# Patient Record
Sex: FEMALE | Race: WHITE | NOT HISPANIC OR LATINO | Employment: PART TIME | URBAN - METROPOLITAN AREA
[De-identification: names, ages, dates, MRNs, and addresses within clinical notes are randomized per-mention and may not be internally consistent; named-entity substitution may affect disease eponyms.]

---

## 2018-03-11 ENCOUNTER — HOSPITAL ENCOUNTER (EMERGENCY)
Facility: CLINIC | Age: 48
Discharge: HOME | End: 2018-03-11
Attending: NURSE PRACTITIONER | Admitting: NURSE PRACTITIONER
Payer: COMMERCIAL

## 2018-03-11 VITALS
OXYGEN SATURATION: 100 % | WEIGHT: 138 LBS | DIASTOLIC BLOOD PRESSURE: 85 MMHG | SYSTOLIC BLOOD PRESSURE: 120 MMHG | TEMPERATURE: 98.9 F | HEIGHT: 66 IN | BODY MASS INDEX: 22.18 KG/M2 | RESPIRATION RATE: 18 BRPM

## 2018-03-11 DIAGNOSIS — K08.89 TOOTH PAIN: ICD-10-CM

## 2018-03-11 DIAGNOSIS — K08.409 S/P TOOTH EXTRACTION: ICD-10-CM

## 2018-03-11 PROCEDURE — 99282 EMERGENCY DEPT VISIT SF MDM: CPT

## 2018-03-11 RX ORDER — OXYCODONE AND ACETAMINOPHEN 5; 325 MG/1; MG/1
1-2 TABLET ORAL EVERY 6 HOURS PRN
Qty: 15 TABLET | Refills: 0 | Status: SHIPPED | OUTPATIENT
Start: 2018-03-11

## 2018-03-11 NOTE — ED AVS SNAPSHOT
[facility] Emergency Department    [address]    [facility] [address]    Phone:  [phone]    Fax:  [phone]                                       Andressa Perez   MRN: [id number]    Department:  [facility] Emergency Department   Date of Visit:  3/11/2018           After Visit Summary Signature Page     I have received my discharge instructions, and my questions have been answered. I have discussed any challenges I see with this plan with the nurse or doctor.    ..........................................................................................................................................  Patient/Patient Representative Signature      ..........................................................................................................................................  Patient Representative Print Name and Relationship to Patient    ..................................................               ................................................  Date                                            Time    ..........................................................................................................................................  Reviewed by Signature/Title    ...................................................              ..............................................  Date                                                            Time

## 2018-03-11 NOTE — ED PROVIDER NOTES
"  History     Chief Complaint:  Dental Problem      HPI   Andressa Perez is a 47 year old female who presents with Dental problem. The patient states that she her left lower tooth removed 10 days ago. Initially ibuprofen and tylenol were helping her pain. However, 3 days ago her pain increased and she was started on amoxicillin by her dentis. She was seen at her primary care provider yesterday and was prescribed tylenol with codeine. The patient has been taking this without improvement. She notes that her pain is primarily along the left side of her jaw. The patient denies any other symptoms.     Allergies:  Sulfa Drugs     Medications:    No known drug allergies.      Past Medical History:    History reviewed.  No significant past medical history.      Past Surgical History:    Orthopedic Surgery     Family History:    History reviewed. No pertinent family history.     Social History:  Marital Status:   Presents to the ED alone  Tobacco Use: Never Used  Alcohol Use: No     Review of Systems   HENT: Positive for dental problem.    All other systems reviewed and are negative.      Physical Exam   First Vitals:  BP: 120/85  Heart Rate: 80  Temp: 98.9  F (37.2  C)  Resp: 18  Height: 167.6 cm (5' 6\")  Weight: 62.6 kg (138 lb)  SpO2: 100 %      Physical Exam  Eyes: Pupils equally round  HENT: Head is normal in appearance. Oropharynx is normal with moist mucus membranes. Tooth #18 is extracted. There is some gingival swelling around the gum. No abscess visualized.   Cardiovascular: Normal color of mucus membranes  Respiratory: Normal respiratory effort  Musculoskeletal: No asymmetry  Skin: Normal, without rash.  Lymphatic: No edema  Neurologic: Cranial nerves grossly intact, normal cognition, no apparent deficits.  Psychiatric: Normal affect.     Emergency Department Course   Emergency Department Course:  Nursing notes and vitals reviewed.  (1849) I performed an exam of the patient as documented above.  "   Findings and plan explained to the patient. Patient discharged home with instructions regarding supportive care, medications, and reasons to return. The importance of close follow-up was reviewed. The patient was prescribed Percocet.      Impression & Plan      Medical Decision Making:  Andressa Perez is a 47 year old female who presents for evaluation of jaw pain. There is no abscess detected around the site of the extracted tooth amenable to incision and drainage. The differential diagnosis includes: cracked tooth syndrome, pulpitis, sub-apical abscess, facial cellulitis, alveolitis amongst others. There is no evidence of deep space infections, significant facial swelling or Blaine's angina. There are no posterior pharyngeal space infections detected. Follow up with a dentist in the coming days is indicated for further work up and treatment. She is already on antibiotics and should continue these.        Diagnosis:    ICD-10-CM    1. Tooth pain K08.89    2. S/P tooth extraction K08.409        Disposition:  discharged to home    Discharge Medications:  Discharge Medication List as of 3/11/2018  7:05 PM      START taking these medications    Details   oxyCODONE-acetaminophen (PERCOCET) 5-325 MG per tablet Take 1-2 tablets by mouth every 6 hours as needed for moderate to severe pain, Disp-15 tablet, R-0, Local Print               I, Megan Gomez, am serving as a scribe on 3/11/2018 at 6:49 PM to personally document services performed by EDDIE Talley, CNP based on my observations and the provider's statements to me.    3/11/2018   [facility] EMERGENCY DEPARTMENT       Agusto Wise APRN CNP  03/11/18 2029

## 2018-03-11 NOTE — ED NOTES
"Pt had left lower tooth removed 10 days ago.  She was started on antibiotic Friday and she was seen in clinic yesterday and given rx for pain.  She has pain in the jaw, \"not where the tooth was pulled\".  "

## 2018-03-11 NOTE — ED AVS SNAPSHOT
[facility] Emergency Department    [address] [place] [address]    Phone:  [phone]    Fax:  [phone]                                       Andressa Perez   MRN: [id number]    Department:  [facility] Emergency Department   Date of Visit:  3/11/2018           Patient Information     Date Of Birth          1970        Your diagnoses for this visit were:     Tooth pain     S/P tooth extraction        You were seen by Agusto Wise, EDDIE CNP.      Follow-up Information     Please follow up.    Why:  f/u with dentist within one week      Discharge References/Attachments     TOOTH EXTRACTION, AFTER: CARING FOR YOUR MOUTH (ENGLISH)      24 Hour Appointment Hotline       To make an appointment at any [facility] clinic, call [phone] ([phone]). If you don't have a family doctor or clinic, we will help you find one. [facility] clinics are conveniently located to serve the needs of you and your family.             Review of your medicines      START taking        Dose / Directions Last dose taken    oxyCODONE-acetaminophen 5-325 MG per tablet   Commonly known as:  PERCOCET   Dose:  1-2 tablet   Quantity:  15 tablet        Take 1-2 tablets by mouth every 6 hours as needed for moderate to severe pain   Refills:  0                Prescriptions were sent or printed at these locations (1 Prescription)                   Other Prescriptions                Printed at Department/Unit printer (1 of 1)         oxyCODONE-acetaminophen (PERCOCET) 5-325 MG per tablet                Orders Needing Specimen Collection     None      Pending Results     No orders found from 3/9/2018 to 3/12/2018.            Pending Culture Results     No orders found from 3/9/2018 to 3/12/2018.            Pending Results Instructions     If you had any lab results that were not finalized at the time of your Discharge, you can call the ED Lab Result RN at [phone]. You will be  contacted by this team for any positive Lab results or changes in treatment. The nurses are available 7 days a week from 10A to 6:30P.  You can leave a message 24 hours per day and they will return your call.        Test Results From Your Hospital Stay               Clinical Quality Measure: Blood Pressure Screening     Your blood pressure was checked while you were in the emergency department today. The last reading we obtained was  BP: 120/85 . Please read the guidelines below about what these numbers mean and what you should do about them.  If your systolic blood pressure (the top number) is less than 120 and your diastolic blood pressure (the bottom number) is less than 80, then your blood pressure is normal. There is nothing more that you need to do about it.  If your systolic blood pressure (the top number) is 120-139 or your diastolic blood pressure (the bottom number) is 80-89, your blood pressure may be higher than it should be. You should have your blood pressure rechecked within a year by a primary care provider.  If your systolic blood pressure (the top number) is 140 or greater or your diastolic blood pressure (the bottom number) is 90 or greater, you may have high blood pressure. High blood pressure is treatable, but if left untreated over time it can put you at risk for heart attack, stroke, or kidney failure. You should have your blood pressure rechecked by a primary care provider within the next 4 weeks.  If your provider in the emergency department today gave you specific instructions to follow-up with your doctor or provider even sooner than that, you should follow that instruction and not wait for up to 4 weeks for your follow-up visit.        Thank you for choosing [facility]       Thank you for choosing [facility] for your care. Our goal is always to provide you with excellent care. Hearing back from our patients is one way we can continue to improve our services. Please take a few minutes to  "complete the written survey that you may receive in the mail after you visit with us. Thank you!        [employer] Information     [employer] lets you send messages to your doctor, view your test results, renew your prescriptions, schedule appointments and more. To sign up, go to [email or web address] . Click on \"Log in\" on the left side of the screen, which will take you to the Welcome page. Then click on \"Sign up Now\" on the right side of the page.     You will be asked to enter the access code listed below, as well as some personal information. Please follow the directions to create your username and password.     Your access code is: [email or web address]  Expires: 2018  7:05 PM     Your access code will  in 90 days. If you need help or a new code, please call your [place] clinic or [phone].        Care EveryWhere ID     This is your Care EveryWhere ID. This could be used by other organizations to access your [place] medical records  [id number]        Equal Access to Services     [facility] Center[facility]ER : Hadii amirah mcdanielo Sor[place], waaxda luqadaha, qaybta kaalmayoan adebrittany, ren fritz . So [facility] [phone].    ATENCIÓN: Si habla español, tiene a ramírez disposición servicios gratuitos de asistencia lingüística. Llame al [phone].    We comply with applicable federal civil rights laws and [place] laws. We do not discriminate on the basis of race, color, national origin, age, disability, sex, sexual orientation, or gender identity.            After Visit Summary       This is your record. Keep this with you and show to your community pharmacist(s) and doctor(s) at your next visit.                  "

## 2019-08-24 ENCOUNTER — HOSPITAL ENCOUNTER (EMERGENCY)
Facility: CLINIC | Age: 49
Discharge: HOME | End: 2019-08-24
Attending: NURSE PRACTITIONER | Admitting: NURSE PRACTITIONER
Payer: COMMERCIAL

## 2019-08-24 VITALS
OXYGEN SATURATION: 96 % | TEMPERATURE: 99 F | HEART RATE: 83 BPM | RESPIRATION RATE: 20 BRPM | SYSTOLIC BLOOD PRESSURE: 112 MMHG | DIASTOLIC BLOOD PRESSURE: 98 MMHG

## 2019-08-24 DIAGNOSIS — L50.9 HIVES: ICD-10-CM

## 2019-08-24 DIAGNOSIS — T78.40XA ALLERGIC REACTION, INITIAL ENCOUNTER: ICD-10-CM

## 2019-08-24 PROCEDURE — 96361 HYDRATE IV INFUSION ADD-ON: CPT

## 2019-08-24 PROCEDURE — 96372 THER/PROPH/DIAG INJ SC/IM: CPT

## 2019-08-24 PROCEDURE — 96374 THER/PROPH/DIAG INJ IV PUSH: CPT

## 2019-08-24 PROCEDURE — 25000128 H RX IP 250 OP 636: Performed by: NURSE PRACTITIONER

## 2019-08-24 PROCEDURE — 99285 EMERGENCY DEPT VISIT HI MDM: CPT | Mod: 25

## 2019-08-24 PROCEDURE — 96375 TX/PRO/DX INJ NEW DRUG ADDON: CPT

## 2019-08-24 RX ORDER — EPINEPHRINE 0.3 MG/.3ML
0.3 INJECTION SUBCUTANEOUS PRN
Qty: 0.6 ML | Refills: 0 | Status: SHIPPED | OUTPATIENT
Start: 2019-08-24

## 2019-08-24 RX ORDER — EPINEPHRINE 1 MG/ML
0.3 INJECTION, SOLUTION, CONCENTRATE INTRAVENOUS ONCE
Status: COMPLETED | OUTPATIENT
Start: 2019-08-24 | End: 2019-08-24

## 2019-08-24 RX ORDER — PREDNISONE 20 MG/1
40 TABLET ORAL DAILY
Qty: 10 TABLET | Refills: 0 | Status: SHIPPED | OUTPATIENT
Start: 2019-08-24 | End: 2019-08-29

## 2019-08-24 RX ORDER — METHYLPREDNISOLONE SODIUM SUCCINATE 125 MG/2ML
125 INJECTION, POWDER, LYOPHILIZED, FOR SOLUTION INTRAMUSCULAR; INTRAVENOUS ONCE
Status: COMPLETED | OUTPATIENT
Start: 2019-08-24 | End: 2019-08-24

## 2019-08-24 RX ADMIN — EPINEPHRINE 0.3 MG: 1 INJECTION INTRAMUSCULAR; INTRAVENOUS; SUBCUTANEOUS at 17:17

## 2019-08-24 RX ADMIN — RANITIDINE HYDROCHLORIDE 50 MG: 25 INJECTION INTRAMUSCULAR; INTRAVENOUS at 17:22

## 2019-08-24 RX ADMIN — SODIUM CHLORIDE 1000 ML: 9 INJECTION, SOLUTION INTRAVENOUS at 17:17

## 2019-08-24 RX ADMIN — METHYLPREDNISOLONE SODIUM SUCCINATE 125 MG: 125 INJECTION, POWDER, FOR SOLUTION INTRAMUSCULAR; INTRAVENOUS at 17:20

## 2019-08-24 ASSESSMENT — ENCOUNTER SYMPTOMS
SHORTNESS OF BREATH: 0
VOICE CHANGE: 0
TROUBLE SWALLOWING: 0
CHILLS: 0
FEVER: 0

## 2019-08-24 NOTE — DISCHARGE INSTRUCTIONS
Discharge Instructions  Allergic Reaction    An allergic reaction can result in a rash, itching, swelling, watery eyes, or a runny nose. A serious reaction can cause swelling of your mouth or throat, or difficulty breathing (wheezing). The most serious allergy is called anaphylaxis, and can be life-threatening. Many allergies result in hives, also called urticaria.       An allergy happens when the body s natural defense system (immune system) overreacts to something. The thing that triggers your allergic reaction is called an allergen. The first time you are exposed to your allergen, you may not have any reaction, but the body makes a protein called an antibody. The antibody lets the body recognize and remember the allergen.  Every time you are exposed to your allergen you get more antibody and your reaction can be more severe.      Generally, every Emergency Department visit should have a follow-up clinic visit with either a primary or a specialty clinic/provider. Please follow-up as instructed by your emergency provider today.    Call 911 if you have:  Swelling of the lips, tongue or throat.  Hoarse voice, drooling or trouble breathing.  Chest pain or shortness of breath.  Fainting or unconsciousness.    What can I do to help myself?  If you know what caused your allergy, do not touch it, throw any of it away, and tell others not to have it around you. Wear a medical alert bracelet with a name of your allergen on it.  If you do not know what you are allergic to, keep a journal of everything that you are exposed to (foods, soaps, medicines, etc.). Take this with you when you follow up with your primary provider or specialist (Allergist). This may help determine what is causing the allergic reaction.  Take any medicines that are prescribed.  Antihistamines can decrease rash or itching. You may use Benadryl  (diphenhydramine) for rash or itching according to package directions, or use a prescription antihistamine as  recommended by your provider.  For significant allergic reactions, you may have been given a prescription for an epinephrine (adrenaline) auto injector. Carry this with you at all times! Use it if you are having any symptoms of anaphylaxis.  Do not be afraid to use it. Return to the Emergency Department if you use your auto injector, call 911 if it does not resolve the symptoms. It is only meant to buy time until you can get to the Emergency Department!  If you were given a prescription for medicine here today, be sure to read all of the information (including the package insert) that comes with your prescription.  This will include important information about the medicine, its side effects, and any warnings that you need to know about.  The pharmacist who fills the prescription can provide more information and answer questions you may have about the medicine.  If you have questions or concerns that the pharmacist cannot address, please call or return to the Emergency Department.   Remember that you can always come back to the Emergency Department if you are not able to see your regular provider in the amount of time listed above, if you get any new symptoms, or if there is anything that worries you.

## 2019-08-24 NOTE — ED PROVIDER NOTES
History     Chief Complaint:  Allergic Reaction    HPI  Andressa Jackie Perez is a 48 year old female  who presents to the emergency department today for evaluation of an allergic reaction to bee stings. She was out biking when a bee flew up her shirt and she was stung several times. After this she began to break out in hives all over her arms and torso and developed some epigastric pain. 30 minutes prior to being seen in the ED she took 3 Benadryl. She has never had a reaction to a bee sting like this before and does not have an Epipen. The patient denies any shortness of breath or feeling of throat swelling.       Allergies:  Sulfa Drugs    Medications:    The patient is not currently taking any prescribed medications.    Past Medical History:    IBS    Past Surgical History:    orthopedic surgery.    Family History:    History reviewed. No pertinent family history.     Social History:  The patient reports that she has never smoked. She does not have any smokeless tobacco history on file. She reports that she does not drink alcohol.   [facility]  Marital Status:  [2]      Review of Systems   Constitutional: Negative for chills and fever.   HENT: Negative for trouble swallowing and voice change.    Respiratory: Negative for shortness of breath.    Cardiovascular: Positive for chest pain (Lower chest/epigastric).   Skin: Positive for rash.   All other systems reviewed and are negative.      Physical Exam     Patient Vitals for the past 24 hrs:   BP Temp Temp src Pulse Resp SpO2   08/24/19 1649 117/84 99  F (37.2  C) Oral 96 20 100 %     Physical Exam  General:  Alert, No obvious discomfort, well kept   Eyes: PERRL, conjunctivae pink no scleral icterus or conjunctival injection  ENT:  Moist mucus membranes, posterior oropharynx clear without erythema or exudates.  Uvula midline without edema, no tongue/lip/oropharngeal swelling.  Mallampati I.  No stridor.  Respiratory:  Lungs clear to  auscultation bilaterally, no crackles/rubs/wheezes.  Good air movement. No wheezing  CV: Normal rate and rhythm, no murmurs/rubs/gallops  GI:  Abdomen soft and non-distended.  Normoactive BS.  No tenderness, guarding or rebound  Skin: Warm, dry.  No rashes or petechiae. Yeshives. generalized  Musculoskeletal: No peripheral edema or calf tenderness  Neuro: Alert and oriented to person/place/time  Psychiatric:  Affect normal. Normal personal interaction. Good eye contact    Emergency Department Course     Interventions:  1717 NS 1L IV Bolus  1717 Epinephrine 0.3 mg IM  1720 methylprednisolone 125 mg IV  1722 Zantac 50 mg IM      Emergency Department Course:  Past medical records, nursing notes, and vitals reviewed.  1654: I performed an exam of the patient and obtained history, as documented above.     1800: I rechecked the patient. Explained findings to patient.    6:18 PM the patient was signed out to my colleague Dr. Murillo who will follow up on and DC patient.     Impression & Plan      Medical Decision Making:  Andressa Perez is a 48 year old female who presents to the emergency department today for evaluation of allergic reaction after bee sting.  Due to the extent of her reaction she presented for evaluation.  Her examination is consistent with anaphylactic type reaction.  She did not have any airway involvement.  She did have significant hives throughout her entire torso.  She is given the above medication with good relief of her symptoms due to the administration of epinephrine she will be observed for 4 hours time.  No testing or imaging is indicated.  For prednisone and EpiPen.  I signed patient out to my partner Dr. Murillo who will discharge patient.  Expect patient will be able to go home no rebound reaction.    Diagnosis:    ICD-10-CM    1. Allergic reaction, initial encounter T78.40XA    2. Hives L50.9        Disposition:   Likely discharged to home    Discharge Medications:     Medication  List      Started    EPINEPHrine 0.3 MG/0.3ML injection 2-pack  Commonly known as:  EPIPEN/ADRENACLICK/or ANY BX GENERIC EQUIV  0.3 mg, Intramuscular, PRN     predniSONE 20 MG tablet  Commonly known as:  DELTASONE  40 mg, Oral, DAILY            Scribe Disclosure:  DON, Jessica Pappas, am serving as a scribe at 4:54 PM on 8/24/2019 to document services personally performed by Chay Reilly APRN based on my observations and the provider's statements to me.    [facility] EMERGENCY DEPARTMENT       Chay Reilly APRN CNP  08/24/19 8716

## 2019-08-24 NOTE — ED AVS SNAPSHOT
[facility] Emergency Department  [address]  [facility] [address]  Phone:  [phone]  Fax:  [phone]                                    Andressa Perez   MRN: [id number]    Department:  [facility] Emergency Department   Date of Visit:  8/24/2019           After Visit Summary Signature Page    I have received my discharge instructions, and my questions have been answered. I have discussed any challenges I see with this plan with the nurse or doctor.    ..........................................................................................................................................  Patient/Patient Representative Signature      ..........................................................................................................................................  Patient Representative Print Name and Relationship to Patient    ..................................................               ................................................  Date                                   Time    ..........................................................................................................................................  Reviewed by Signature/Title    ...................................................              ..............................................  Date                                               Time          22EPIC Rev 08/18

## 2019-08-24 NOTE — ED TRIAGE NOTES
Arrives with itchiness and hives after being stung by a wasp 2-3 times on her back, states she also has a burning type feeling in her mid chest/epigastric area. Denies SOB, alert and oriented, ABCs intact.

## 2022-07-06 DIAGNOSIS — R53.83 FATIGUE: ICD-10-CM

## 2022-07-06 DIAGNOSIS — E66.89 OBESITY OF ENDOCRINE ORIGIN: ICD-10-CM

## 2022-07-06 DIAGNOSIS — E63.9 NUTRITIONAL DEFICIENCY: ICD-10-CM

## 2022-07-06 DIAGNOSIS — Z77.120 CONTACT WITH MOLD: ICD-10-CM

## 2022-07-06 DIAGNOSIS — R14.0 ABDOMINAL DISTENTION: Primary | ICD-10-CM

## 2022-07-06 DIAGNOSIS — K59.00 CONSTIPATION: ICD-10-CM

## 2022-07-07 ENCOUNTER — MEDICAL CORRESPONDENCE (OUTPATIENT)
Dept: ADMINISTRATIVE | Facility: CLINIC | Age: 52
End: 2022-07-07

## 2022-07-19 ENCOUNTER — LAB (OUTPATIENT)
Dept: LAB | Facility: CLINIC | Age: 52
End: 2022-07-19
Payer: COMMERCIAL

## 2022-07-19 DIAGNOSIS — E63.9 NUTRITIONAL DEFICIENCY: ICD-10-CM

## 2022-07-19 DIAGNOSIS — E66.89 OBESITY OF ENDOCRINE ORIGIN: ICD-10-CM

## 2022-07-19 DIAGNOSIS — R53.83 FATIGUE: ICD-10-CM

## 2022-07-19 DIAGNOSIS — Z77.120 CONTACT WITH MOLD: ICD-10-CM

## 2022-07-19 DIAGNOSIS — K59.00 CONSTIPATION: ICD-10-CM

## 2022-07-19 DIAGNOSIS — R14.0 ABDOMINAL DISTENTION: ICD-10-CM

## 2022-07-19 LAB
ALBUMIN SERPL-MCNC: 3.6 G/DL (ref 3.4–5)
ALP SERPL-CCNC: 61 U/L (ref 40–150)
ALT SERPL W P-5'-P-CCNC: 20 U/L (ref 0–50)
ANION GAP SERPL CALCULATED.3IONS-SCNC: 6 MMOL/L (ref 3–14)
AST SERPL W P-5'-P-CCNC: 18 U/L (ref 0–45)
BASOPHILS # BLD AUTO: 0 10E3/UL (ref 0–0.2)
BASOPHILS NFR BLD AUTO: 1 %
BILIRUB SERPL-MCNC: 0.6 MG/DL (ref 0.2–1.3)
BUN SERPL-MCNC: 13 MG/DL (ref 7–30)
CALCIUM SERPL-MCNC: 8.6 MG/DL (ref 8.5–10.1)
CHLORIDE BLD-SCNC: 109 MMOL/L (ref 94–109)
CHOLEST SERPL-MCNC: 239 MG/DL
CO2 SERPL-SCNC: 24 MMOL/L (ref 20–32)
CORTIS SERPL-MCNC: 13.1 UG/DL
CREAT SERPL-MCNC: 0.69 MG/DL (ref 0.52–1.04)
CRP SERPL HS-MCNC: 0.16 MG/L
DEPRECATED CALCIDIOL+CALCIFEROL SERPL-MC: 69 UG/L (ref 20–75)
DHEA-S SERPL-MCNC: 33 UG/DL (ref 35–430)
EOSINOPHIL # BLD AUTO: 0.2 10E3/UL (ref 0–0.7)
EOSINOPHIL NFR BLD AUTO: 4 %
ERYTHROCYTE [DISTWIDTH] IN BLOOD BY AUTOMATED COUNT: 12.7 % (ref 10–15)
ERYTHROCYTE [SEDIMENTATION RATE] IN BLOOD BY WESTERGREN METHOD: 7 MM/HR (ref 0–30)
FASTING STATUS PATIENT QL REPORTED: YES
FERRITIN SERPL-MCNC: 55 NG/ML (ref 8–252)
FIBRINOGEN PPP-MCNC: 302 MG/DL (ref 170–490)
GFR SERPL CREATININE-BSD FRML MDRD: >90 ML/MIN/1.73M2
GGT SERPL-CCNC: 6 U/L (ref 0–40)
GLUCOSE BLD-MCNC: 89 MG/DL (ref 70–99)
HBA1C MFR BLD: 5 % (ref 0–5.6)
HCT VFR BLD AUTO: 40.2 % (ref 35–47)
HDLC SERPL-MCNC: 77 MG/DL
HGB BLD-MCNC: 13.7 G/DL (ref 11.7–15.7)
IGF-I BLD-MCNC: 179 NG/ML (ref 55–235)
IMM GRANULOCYTES # BLD: 0 10E3/UL
IMM GRANULOCYTES NFR BLD: 0 %
INSULIN SERPL-ACNC: 7 UU/ML (ref 2.6–24.9)
IRON SATN MFR SERPL: 27 % (ref 15–46)
IRON SERPL-MCNC: 96 UG/DL (ref 35–180)
LDLC SERPL CALC-MCNC: 149 MG/DL
LYMPHOCYTES # BLD AUTO: 1.5 10E3/UL (ref 0.8–5.3)
LYMPHOCYTES NFR BLD AUTO: 39 %
MCH RBC QN AUTO: 30.6 PG (ref 26.5–33)
MCHC RBC AUTO-ENTMCNC: 34.1 G/DL (ref 31.5–36.5)
MCV RBC AUTO: 90 FL (ref 78–100)
MONOCYTES # BLD AUTO: 0.3 10E3/UL (ref 0–1.3)
MONOCYTES NFR BLD AUTO: 7 %
NEUTROPHILS # BLD AUTO: 1.9 10E3/UL (ref 1.6–8.3)
NEUTROPHILS NFR BLD AUTO: 49 %
NONHDLC SERPL-MCNC: 162 MG/DL
NRBC # BLD AUTO: 0 10E3/UL
NRBC BLD AUTO-RTO: 0 /100
PLATELET # BLD AUTO: 263 10E3/UL (ref 150–450)
POTASSIUM BLD-SCNC: 3.7 MMOL/L (ref 3.4–5.3)
PROT SERPL-MCNC: 6.9 G/DL (ref 6.8–8.8)
RBC # BLD AUTO: 4.48 10E6/UL (ref 3.8–5.2)
SODIUM SERPL-SCNC: 139 MMOL/L (ref 133–144)
T4 SERPL-MCNC: 7.3 UG/DL (ref 4.5–11.7)
THYROGLOB AB SERPL IA-ACNC: <20 IU/ML
THYROPEROXIDASE AB SERPL-ACNC: <10 IU/ML
TIBC SERPL-MCNC: 352 UG/DL (ref 240–430)
TRIGL SERPL-MCNC: 65 MG/DL
TSH SERPL DL<=0.005 MIU/L-ACNC: 0.92 MU/L (ref 0.4–4)
URATE SERPL-MCNC: 3.1 MG/DL (ref 2.6–6)
WBC # BLD AUTO: 3.9 10E3/UL (ref 4–11)

## 2022-07-19 PROCEDURE — 36415 COLL VENOUS BLD VENIPUNCTURE: CPT

## 2022-07-19 PROCEDURE — 86800 THYROGLOBULIN ANTIBODY: CPT

## 2022-07-19 PROCEDURE — 82525 ASSAY OF COPPER: CPT

## 2022-07-19 PROCEDURE — 84630 ASSAY OF ZINC: CPT

## 2022-07-19 PROCEDURE — 86376 MICROSOMAL ANTIBODY EACH: CPT

## 2022-07-19 PROCEDURE — 80053 COMPREHEN METABOLIC PANEL: CPT

## 2022-07-19 PROCEDURE — 82977 ASSAY OF GGT: CPT

## 2022-07-19 PROCEDURE — 85384 FIBRINOGEN ACTIVITY: CPT

## 2022-07-19 PROCEDURE — 84999 UNLISTED CHEMISTRY PROCEDURE: CPT

## 2022-07-19 PROCEDURE — 82533 TOTAL CORTISOL: CPT

## 2022-07-19 PROCEDURE — 84481 FREE ASSAY (FT-3): CPT

## 2022-07-19 PROCEDURE — 84305 ASSAY OF SOMATOMEDIN: CPT

## 2022-07-19 PROCEDURE — 83615 LACTATE (LD) (LDH) ENZYME: CPT

## 2022-07-19 PROCEDURE — 82627 DEHYDROEPIANDROSTERONE: CPT

## 2022-07-19 PROCEDURE — 84436 ASSAY OF TOTAL THYROXINE: CPT

## 2022-07-19 PROCEDURE — 83036 HEMOGLOBIN GLYCOSYLATED A1C: CPT

## 2022-07-19 PROCEDURE — 85652 RBC SED RATE AUTOMATED: CPT

## 2022-07-19 PROCEDURE — 84550 ASSAY OF BLOOD/URIC ACID: CPT

## 2022-07-19 PROCEDURE — 84443 ASSAY THYROID STIM HORMONE: CPT

## 2022-07-19 PROCEDURE — 84479 ASSAY OF THYROID (T3 OR T4): CPT

## 2022-07-19 PROCEDURE — 83550 IRON BINDING TEST: CPT

## 2022-07-19 PROCEDURE — 82306 VITAMIN D 25 HYDROXY: CPT

## 2022-07-19 PROCEDURE — 84482 T3 REVERSE: CPT

## 2022-07-19 PROCEDURE — 83090 ASSAY OF HOMOCYSTEINE: CPT

## 2022-07-19 PROCEDURE — 82390 ASSAY OF CERULOPLASMIN: CPT

## 2022-07-19 PROCEDURE — 83735 ASSAY OF MAGNESIUM: CPT

## 2022-07-19 PROCEDURE — 83625 ASSAY OF LDH ENZYMES: CPT

## 2022-07-19 PROCEDURE — 83525 ASSAY OF INSULIN: CPT

## 2022-07-19 PROCEDURE — 85025 COMPLETE CBC W/AUTO DIFF WBC: CPT

## 2022-07-19 PROCEDURE — 86141 C-REACTIVE PROTEIN HS: CPT

## 2022-07-19 PROCEDURE — 82728 ASSAY OF FERRITIN: CPT

## 2022-07-19 PROCEDURE — 80061 LIPID PANEL: CPT

## 2022-07-20 LAB
HCYS SERPL-SCNC: 8.3 UMOL/L (ref 4–12)
Lab: 1842
PERFORMING LABORATORY: NORMAL
SPECIMEN STATUS: NORMAL
T3FREE SERPL-MCNC: 3.3 PG/ML (ref 2–4.4)
T3RU NFR SERPL: 34 %
TEST NAME: NORMAL

## 2022-07-21 LAB
CERULOPLASMIN SERPL-MCNC: 26 MG/DL (ref 20–60)
COPPER SERPL-MCNC: 107.7 UG/DL
ZINC SERPL-MCNC: 67.5 UG/DL

## 2022-07-24 LAB — T3REVERSE SERPL-MCNC: 13.8 NG/DL

## 2022-07-25 LAB — LABCORP INTERFACED MISCELLANEOUS TEST RESULT: NORMAL
